# Patient Record
Sex: MALE | Race: WHITE
[De-identification: names, ages, dates, MRNs, and addresses within clinical notes are randomized per-mention and may not be internally consistent; named-entity substitution may affect disease eponyms.]

---

## 2017-01-04 ENCOUNTER — HOSPITAL ENCOUNTER (INPATIENT)
Dept: HOSPITAL 47 - EC | Age: 67
LOS: 3 days | Discharge: HOME HEALTH | DRG: 373 | End: 2017-01-07
Payer: MEDICARE

## 2017-01-04 VITALS — BODY MASS INDEX: 32.6 KG/M2

## 2017-01-04 DIAGNOSIS — Z79.899: ICD-10-CM

## 2017-01-04 DIAGNOSIS — E03.9: ICD-10-CM

## 2017-01-04 DIAGNOSIS — Z79.82: ICD-10-CM

## 2017-01-04 DIAGNOSIS — K80.20: ICD-10-CM

## 2017-01-04 DIAGNOSIS — Z88.0: ICD-10-CM

## 2017-01-04 DIAGNOSIS — K35.2: Primary | ICD-10-CM

## 2017-01-04 DIAGNOSIS — I10: ICD-10-CM

## 2017-01-04 PROCEDURE — 87075 CULTR BACTERIA EXCEPT BLOOD: CPT

## 2017-01-04 PROCEDURE — 85027 COMPLETE CBC AUTOMATED: CPT

## 2017-01-04 PROCEDURE — 85025 COMPLETE CBC W/AUTO DIFF WBC: CPT

## 2017-01-04 PROCEDURE — 80053 COMPREHEN METABOLIC PANEL: CPT

## 2017-01-04 PROCEDURE — 85730 THROMBOPLASTIN TIME PARTIAL: CPT

## 2017-01-04 PROCEDURE — 75989 ABSCESS DRAINAGE UNDER X-RAY: CPT

## 2017-01-04 PROCEDURE — 87205 SMEAR GRAM STAIN: CPT

## 2017-01-04 PROCEDURE — 87186 SC STD MICRODIL/AGAR DIL: CPT

## 2017-01-04 PROCEDURE — 85610 PROTHROMBIN TIME: CPT

## 2017-01-04 PROCEDURE — 87070 CULTURE OTHR SPECIMN AEROBIC: CPT

## 2017-01-04 PROCEDURE — 99285 EMERGENCY DEPT VISIT HI MDM: CPT

## 2017-01-04 PROCEDURE — 87077 CULTURE AEROBIC IDENTIFY: CPT

## 2017-01-04 NOTE — ED
General Adult HPI





- General


Chief complaint: Recheck/Abnormal Lab/Rx


Stated complaint: Adominal Pain


Time Seen by Provider: 01/04/17 22:43


Source: patient, EMS, RN notes reviewed


Mode of arrival: EMS


Limitations: no limitations





- History of Present Illness


Initial comments: 





Patient is a pleasant 66-year-old male presenting to the emergency department 

from transfer from [place].  Patient was transferred for surgical and 

possible interventional radiology care.  Patient has had abdominal discomfort 

for the past week.  Patient did have nausea and vomiting however that has 

resolved.  Patient has had intermittent fevers.  Patient has had discomfort 

right lower abdomen.  Patient states it did seem to get better and then worse 

again.  Patient has discomfort extending towards the right leg.  Discomfort is 

tolerable at this time.





- Related Data


 Home Medications











 Medication  Instructions  Recorded  Confirmed


 


Aspirin EC [Ecotrin Low Dose] 81 mg PO DAILY 01/04/17 01/04/17


 


Levothyroxine Sodium [Synthroid] 50 mcg PO DAILY 01/04/17 01/04/17











 Allergies











Allergy/AdvReac Type Severity Reaction Status Date / Time


 


Penicillins Allergy  Rash/Hives Verified 01/04/17 22:40














Review of Systems


ROS Statement: 


Those systems with pertinent positive or pertinent negative responses have been 

documented in the HPI.





ROS Other: All systems not noted in ROS Statement are negative.


Constitutional: Reports: fever, chills


Eyes: Denies: eye pain


ENT: Denies: ear pain


Respiratory: Denies: cough


Cardiovascular: Denies: chest pain


Endocrine: Denies: fatigue


Gastrointestinal: Reports: abdominal pain, nausea, vomiting (Resolved)


Genitourinary: Denies: dysuria


Musculoskeletal: Denies: back pain


Skin: Denies: rash


Neurological: Denies: weakness





Past Medical History


Past Medical History: Hypertension, Thyroid Disorder


History of Any Multi-Drug Resistant Organisms: None Reported


Past Surgical History: No Surgical Hx Reported


Past Psychological History: No Psychological Hx Reported


Smoking Status: Never smoker


Past Alcohol Use History: None Reported


Past Drug Use History: None Reported





General Exam


Limitations: no limitations


General appearance: alert, in no apparent distress


Head exam: Present: atraumatic


Eye exam: Present: normal appearance, PERRL


ENT exam: Present: normal oropharynx


Neck exam: Present: normal inspection


Respiratory exam: Present: normal lung sounds bilaterally


Cardiovascular Exam: Present: regular rate, normal rhythm


GI/Abdominal exam: Present: soft, tenderness (Mild tenderness right lower 

abdomen).  Absent: distended, guarding, rebound


Extremities exam: Present: normal inspection


Neurological exam: Present: alert


Psychiatric exam: Present: normal affect, normal mood


Skin exam: Absent: rash





Course


 Vital Signs











  01/04/17





  22:27


 


Temperature 98.8 F


 


Pulse Rate 84


 


Respiratory 16





Rate 


 


Blood Pressure 139/60


 


O2 Sat by Pulse 96





Oximetry 














- Reevaluation(s)


Reevaluation #1: 





01/04/17 23:06


Chart and lab work and CT results reviewed from [facility].  Patient 

has been white blood cell count of 16.  Patient does not meet Sirs criteria 

otherwise.  Computed tomography scan has concern for abscess in the right lower 

quadrant up to 6 cm with nonvisualized appendix.  Dr. Mueller has been paged for 

surgical call.


01/04/17 23:07


Patient was started on Cipro and Flagyl.


Antibiotics will be continued.  Admission orders written.


01/04/17 23:11


Case was discussed with Dr. Field who will review computed tomography scan with 

radiologist prior to making decision on appropriate further treatment.





Disposition


Clinical Impression: 


 Abdominal abscess





Disposition: ADMITTED AS IP TO THIS HOSP


Condition: Serious


Referrals: 


Nonstaff,Physician [Primary Care Provider] - 1-2 days


Time of Disposition: 23:07

## 2017-01-05 LAB
ALP SERPL-CCNC: 94 U/L (ref 38–126)
ALT SERPL-CCNC: 69 U/L (ref 21–72)
ANION GAP SERPL CALC-SCNC: 14 MMOL/L
APTT BLD: 23.7 SEC (ref 22–30)
AST SERPL-CCNC: 50 U/L (ref 17–59)
BASOPHILS # BLD AUTO: 0 K/UL (ref 0–0.2)
BASOPHILS NFR BLD AUTO: 0 %
BUN SERPL-SCNC: 13 MG/DL (ref 9–20)
CALCIUM SPEC-MCNC: 8.3 MG/DL (ref 8.4–10.2)
CH: 28.6
CHCM: 32.8
CHLORIDE SERPL-SCNC: 101 MMOL/L (ref 98–107)
CO2 SERPL-SCNC: 24 MMOL/L (ref 22–30)
EOSINOPHIL # BLD AUTO: 0.2 K/UL (ref 0–0.7)
EOSINOPHIL NFR BLD AUTO: 2 %
ERYTHROCYTE [DISTWIDTH] IN BLOOD BY AUTOMATED COUNT: 4.69 M/UL (ref 4.3–5.9)
ERYTHROCYTE [DISTWIDTH] IN BLOOD: 13 % (ref 11.5–15.5)
GLUCOSE SERPL-MCNC: 92 MG/DL (ref 74–99)
HCT VFR BLD AUTO: 41.1 % (ref 39–53)
HDW: 2.36
HGB BLD-MCNC: 13.6 GM/DL (ref 13–17.5)
INR PPP: 1.2 (ref ?–1.1)
LUC NFR BLD AUTO: 1 %
LYMPHOCYTES # SPEC AUTO: 0.7 K/UL (ref 1–4.8)
LYMPHOCYTES NFR SPEC AUTO: 5 %
MCH RBC QN AUTO: 29 PG (ref 25–35)
MCHC RBC AUTO-ENTMCNC: 33.1 G/DL (ref 31–37)
MCV RBC AUTO: 87.7 FL (ref 80–100)
MONOCYTES # BLD AUTO: 0.6 K/UL (ref 0–1)
MONOCYTES NFR BLD AUTO: 4 %
NEUTROPHILS # BLD AUTO: 12 K/UL (ref 1.3–7.7)
NEUTROPHILS NFR BLD AUTO: 87 %
NON-AFRICAN AMERICAN GFR(MDRD): >60
POTASSIUM SERPL-SCNC: 5.1 MMOL/L (ref 3.5–5.1)
PROT SERPL-MCNC: 6.4 G/DL (ref 6.3–8.2)
PT BLD: 11.8 SEC (ref 9–12)
SODIUM SERPL-SCNC: 139 MMOL/L (ref 137–145)
WBC # BLD AUTO: 0.18 10*3/UL
WBC # BLD AUTO: 13.7 K/UL (ref 3.8–10.6)
WBC (PEROX): 14.25

## 2017-01-05 PROCEDURE — 0W9M30Z DRAINAGE OF MALE PERINEUM WITH DRAINAGE DEVICE, PERCUTANEOUS APPROACH: ICD-10-PCS

## 2017-01-05 RX ADMIN — CEFAZOLIN SCH MLS/HR: 330 INJECTION, POWDER, FOR SOLUTION INTRAMUSCULAR; INTRAVENOUS at 18:01

## 2017-01-05 RX ADMIN — CEFAZOLIN SCH: 330 INJECTION, POWDER, FOR SOLUTION INTRAMUSCULAR; INTRAVENOUS at 14:03

## 2017-01-05 RX ADMIN — METRONIDAZOLE SCH MLS/HR: 500 INJECTION, SOLUTION INTRAVENOUS at 01:13

## 2017-01-05 RX ADMIN — METRONIDAZOLE SCH MLS/HR: 500 INJECTION, SOLUTION INTRAVENOUS at 13:59

## 2017-01-05 RX ADMIN — METRONIDAZOLE SCH MLS/HR: 500 INJECTION, SOLUTION INTRAVENOUS at 23:32

## 2017-01-05 RX ADMIN — CEFAZOLIN SCH MLS/HR: 330 INJECTION, POWDER, FOR SOLUTION INTRAMUSCULAR; INTRAVENOUS at 00:50

## 2017-01-05 RX ADMIN — LEVOFLOXACIN SCH MLS/HR: 750 INJECTION, SOLUTION INTRAVENOUS at 07:28

## 2017-01-05 RX ADMIN — METRONIDAZOLE SCH MLS/HR: 500 INJECTION, SOLUTION INTRAVENOUS at 06:04

## 2017-01-05 RX ADMIN — PANTOPRAZOLE SODIUM SCH MG: 40 INJECTION, POWDER, FOR SOLUTION INTRAVENOUS at 08:25

## 2017-01-05 RX ADMIN — METRONIDAZOLE SCH MLS/HR: 500 INJECTION, SOLUTION INTRAVENOUS at 18:00

## 2017-01-05 RX ADMIN — CEFAZOLIN SCH MLS/HR: 330 INJECTION, POWDER, FOR SOLUTION INTRAMUSCULAR; INTRAVENOUS at 23:32

## 2017-01-05 NOTE — CT
EXAMINATION TYPE: CT guided abscess drainage

 

DATE OF EXAM: 1/5/2017 12:17 PM

 

COMPARISON: EXAMINATION TYPE: CT guided abscess drainage

 

DATE OF EXAM: 1/5/2017 12:17 PM

 

HISTORY: Abdominal abscess

 

COMPARISON: NONE

 

PROCEDURE: 

 

Maximal barrier technique was utilized.  The skin over suitable path to the abscess was localized wit
h CT and the overlying skin prepped and draped.  Lidocaine was used for local anesthesia.  A skin kaur
k made with a scalpel.  Access was gained using CT guidance with a 21-gauge needle, purulent material
 returned in the hub of the needle.  A 0.018 inch wire was advanced and the access site was upsized, 
the wire was upsized and subsequently an 8.5-English drain was deployed within the abscess cavity and 
fixed in place.  Catheter attached to gravity drainage.  No immediate complication.  Purulent materia
l sent for laboratory analysis and draining into the bag.  The patient remained in stable condition.

 

IMPRESSION: 

 

STATUS POST CT GUIDED ABSCESS DRAINAGE, MICROBIOLOGY ANALYSIS IS PENDING.  THIS PROCEDURE WAS PERFORM
ED BY THE UNDERSIGNED.

 

CT DLP: 1105 mGycm

Automated exposure control for dose reduction was used.

## 2017-01-05 NOTE — HP
DATE OF ADMISSION:  



Chief complaint is abdominal pain and intra-abdominal abscess. 



HISTORY:  Jonel is a 66-year-old man who is usually in good health.  On 

12/25, he started having some GI complaints later in the day, had some 

nausea and vomiting. He went to bed and woke up about every hour with 

repeated vomiting and diarrhea. He did not feel well Monday, 12/26 but 

Tuesday was doing a little bit better and went to work.  he was not 

having much in the way of appetite that week and may have had a 

low-grade fever. He said he went in and was seen a couple of times by 

a physician but did not have any lab or imaging done and so he went to 

urgent care yesterday and they sent him to the emergency department 

where he had a CT scan done showing an abscess. He had not had any 

previous problems with diarrhea, he had not noticed any blood in the 

stools or dark tarry stools. No weight loss. He has not had a 

colonoscopy in about 20 years. There is no family history of GI 

malignancy or inflammatory bowel disease. No one else at home had been 

ill.  



His past medical history is positive for hypertension and 

hypothyroidism.  



PAST SURGICAL HISTORY: Denies any abdominal surgery. 



Medications as outpatient include: 

1. Aspirin.

2. Synthroid. 



Allergies include PENICILLINS, which causes a rash or hives. 



SOCIAL: Lives by himself. No tobacco or alcohol use. 



FAMILY HISTORY: Negative for GI malignancy or inflammatory bowel 

disease.  



REVIEW OF SYSTEMS: Unremarkable except as per chief complaint. 



PHYSICAL EXAM: 

Pleasant 66-year-old man. He is in no acute distress. He is alert and 

oriented x3. He has been afebrile since admission. Pulse is 68, 

respirations 16, blood pressure is 116/65.  

His neck is supple. No adenopathy or thyromegaly. Heart is regular 

rate and rhythm without murmur.  

Lungs are clear to auscultation. No wheezes, rales, rhonchi, or rubs. 

Abdomen is soft. Positive bowel sounds. He has got fullness in the 

right lower quadrant. No real guarding or rebound. No peritoneal 

signs.  

Extremities are without pitting edema. 



LAB: White count is 13,700, hemoglobin 13.6, platelets 334,000. His 

sodium and potassium are within normal range. BUN and creatinine are 

not elevated. CT scan of the abdomen and pelvis was reviewed with the 

radiologist. The appendix is not visualized. There is a complex fluid 

collection in the right lower quadrant consistent with large abscess 

and he has noted a large gallstone in the gallbladder along with 

multiple cysts in the kidneys.  



ASSESSMENT: 

1. Intra-abdominal abscess, likely delayed perforated appendicitis. 

2. Cholelithiasis. 

3. Hypertension. 

4. Hypothyroidism. 



PLAN:  Will discuss the case with the interventional radiologist for 

attempted percutaneous drainage, then delayed laparoscopic 

appendectomy if that is successful.  Will do DVT and ulcer prophylaxis 

and further recommendations to follow.

## 2017-01-06 LAB
CH: 28.7
CHCM: 32.7
ERYTHROCYTE [DISTWIDTH] IN BLOOD BY AUTOMATED COUNT: 4.35 M/UL (ref 4.3–5.9)
ERYTHROCYTE [DISTWIDTH] IN BLOOD: 13 % (ref 11.5–15.5)
HCT VFR BLD AUTO: 38.2 % (ref 39–53)
HDW: 2.41
HGB BLD-MCNC: 12.4 GM/DL (ref 13–17.5)
MCH RBC QN AUTO: 28.5 PG (ref 25–35)
MCHC RBC AUTO-ENTMCNC: 32.4 G/DL (ref 31–37)
MCV RBC AUTO: 88 FL (ref 80–100)
WBC # BLD AUTO: 10.6 K/UL (ref 3.8–10.6)

## 2017-01-06 RX ADMIN — METRONIDAZOLE SCH MLS/HR: 500 INJECTION, SOLUTION INTRAVENOUS at 13:41

## 2017-01-06 RX ADMIN — CEFAZOLIN SCH: 330 INJECTION, POWDER, FOR SOLUTION INTRAMUSCULAR; INTRAVENOUS at 18:29

## 2017-01-06 RX ADMIN — LEVOFLOXACIN SCH MLS/HR: 750 INJECTION, SOLUTION INTRAVENOUS at 06:37

## 2017-01-06 RX ADMIN — PANTOPRAZOLE SODIUM SCH MG: 40 INJECTION, POWDER, FOR SOLUTION INTRAVENOUS at 08:25

## 2017-01-06 RX ADMIN — LEVOTHYROXINE SODIUM SCH MCG: 50 TABLET ORAL at 05:53

## 2017-01-06 RX ADMIN — ASPIRIN 81 MG CHEWABLE TABLET SCH MG: 81 TABLET CHEWABLE at 08:24

## 2017-01-06 RX ADMIN — METRONIDAZOLE SCH MLS/HR: 500 INJECTION, SOLUTION INTRAVENOUS at 05:32

## 2017-01-06 RX ADMIN — METRONIDAZOLE SCH MLS/HR: 500 INJECTION, SOLUTION INTRAVENOUS at 23:46

## 2017-01-06 RX ADMIN — LISINOPRIL SCH MG: 20 TABLET ORAL at 08:24

## 2017-01-06 RX ADMIN — CEFAZOLIN SCH MLS/HR: 330 INJECTION, POWDER, FOR SOLUTION INTRAMUSCULAR; INTRAVENOUS at 12:42

## 2017-01-06 RX ADMIN — METRONIDAZOLE SCH MLS/HR: 500 INJECTION, SOLUTION INTRAVENOUS at 18:05

## 2017-01-06 NOTE — PN
DATE OF SERVICE:  01/06/2017



Jonel is seen on rounds. He is status post percutaneous drainage of 

intra-abdominal abscess. He is having mild discomfort. He is 

tolerating a diet. No nausea, no vomiting.  



PHYSICAL EXAM: He has been afebrile. Pulse is 60, respirations 16, 

blood pressure 123/62. His abdomen is soft. He has fullness in the 

right lower quadrant without guarding or rebound. The drain is 

draining mucopurulent material. Cultures were sent. Gram stain showed 

multiple gram-positive and gram-negative organisms.  



LAB: His white count is 10,600, hemoglobin is 12.4. 



ASSESSMENT: Status post percutaneous drainage of abscess, likely due 

to perforated appendicitis.  



PLAN:  Will continue the IV antibiotics one more day, arrange home 

health care for checking his drain and irrigating it daily. I will see 

him in the office next week and I explained to him that although this 

is likely an acute appendicitis with perforation, there can be other 

causes so he should have a colonoscopy performed within the next month 

or so. Then after he is recuperated, we will see about doing a 

laparoscopy with appendectomy and then possible cholecystectomy, which 

will be discussed in the office.

## 2017-01-07 VITALS — RESPIRATION RATE: 16 BRPM

## 2017-01-07 VITALS — TEMPERATURE: 98.6 F | HEART RATE: 64 BPM | SYSTOLIC BLOOD PRESSURE: 139 MMHG | DIASTOLIC BLOOD PRESSURE: 68 MMHG

## 2017-01-07 LAB
CH: 28.9
CHCM: 32.6
ERYTHROCYTE [DISTWIDTH] IN BLOOD BY AUTOMATED COUNT: 4.29 M/UL (ref 4.3–5.9)
ERYTHROCYTE [DISTWIDTH] IN BLOOD: 12.9 % (ref 11.5–15.5)
HCT VFR BLD AUTO: 38.2 % (ref 39–53)
HDW: 2.4
HGB BLD-MCNC: 12 GM/DL (ref 13–17.5)
MCH RBC QN AUTO: 28 PG (ref 25–35)
MCHC RBC AUTO-ENTMCNC: 31.5 G/DL (ref 31–37)
MCV RBC AUTO: 89 FL (ref 80–100)
WBC # BLD AUTO: 10.4 K/UL (ref 3.8–10.6)

## 2017-01-07 RX ADMIN — ASPIRIN 81 MG CHEWABLE TABLET SCH MG: 81 TABLET CHEWABLE at 08:20

## 2017-01-07 RX ADMIN — METRONIDAZOLE SCH: 500 INJECTION, SOLUTION INTRAVENOUS at 12:50

## 2017-01-07 RX ADMIN — LEVOFLOXACIN SCH MLS/HR: 750 INJECTION, SOLUTION INTRAVENOUS at 06:13

## 2017-01-07 RX ADMIN — LISINOPRIL SCH MG: 20 TABLET ORAL at 08:20

## 2017-01-07 RX ADMIN — METRONIDAZOLE SCH MLS/HR: 500 INJECTION, SOLUTION INTRAVENOUS at 05:11

## 2017-01-07 RX ADMIN — LEVOTHYROXINE SODIUM SCH MCG: 50 TABLET ORAL at 06:12

## 2017-01-07 RX ADMIN — CEFAZOLIN SCH MLS/HR: 330 INJECTION, POWDER, FOR SOLUTION INTRAMUSCULAR; INTRAVENOUS at 05:11

## 2017-01-07 NOTE — P.DS
Providers


Date of admission: 


01/04/17 23:07





Expected date of discharge: 01/07/17


Attending physician: 


Radha Mueller





Primary care physician: 


Physician Nonstaff





Hospital Course: 





Patient admitted with lower abdominal abscess.  This is thought to be on the 

basis of appendiceal abscess.  His percutaneous drain is functioning properly.  

His pain is minimal.  He is tolerating a diet.  He is afebrile.  White blood 

cell count is normal.  Plans are underway for him to be discharged today.  He 

will follow-up with Dr. Mueller this coming week.  Prescription for antibiotics 

is provided.


Patient Condition at Discharge: Serious





Plan - Discharge Summary


New Discharge Prescriptions: 


Ciprofloxacin HCl [Cipro] 500 mg PO Q12HR #28 tablet


metroNIDAZOLE [Flagyl] 500 mg PO Q8HR #42 tab


Discharge Medication List





Aspirin EC [Ecotrin Low Dose] 81 mg PO DAILY 01/04/17 [History]


Enalapril [Vasotec] 20 mg PO DAILY 01/04/17 [History]


Levothyroxine Sodium [Synthroid] 50 mcg PO DAILY 01/04/17 [History]


amLODIPine [Norvasc] 10 mg PO DAILY 01/04/17 [History]


Ciprofloxacin HCl [Cipro] 500 mg PO Q12HR #28 tablet 01/06/17 [Rx]


metroNIDAZOLE [Flagyl] 500 mg PO Q8HR #42 tab 01/06/17 [Rx]








Follow up Appointment(s)/Referral(s): 


Radha Mueller DO [Doctor of Osteopathic Medicine] - 01/12/17 4:15 pm


Nonstaff,Physician [Primary Care Provider] - 1-2 days


Activity/Diet/Wound Care/Special Instructions: 


Home Care - [facility] - [phone]


You need to have a colonoscopy done in the next 1-2 months.


The Drain tube should be irrigated with 10-20ML of NS daily.


Call if fever >100.5, increased abdominal pain, nausea or vomiting, or any 

other concerns.


Eat yogurt 2 times a day or take a probiotic daily.


Discharge Disposition: HOME WITH HOME HEALTH SERVICES

## 2018-08-10 ENCOUNTER — HOSPITAL ENCOUNTER (OUTPATIENT)
Dept: HOSPITAL 47 - LABPAT | Age: 68
Discharge: HOME | End: 2018-08-10
Payer: MEDICARE

## 2018-08-10 DIAGNOSIS — R94.39: ICD-10-CM

## 2018-08-10 DIAGNOSIS — I10: ICD-10-CM

## 2018-08-10 DIAGNOSIS — Z01.812: Primary | ICD-10-CM

## 2018-08-10 LAB
ANION GAP SERPL CALC-SCNC: 8 MMOL/L
BUN SERPL-SCNC: 17 MG/DL (ref 9–20)
CHLORIDE SERPL-SCNC: 104 MMOL/L (ref 98–107)
CO2 SERPL-SCNC: 29 MMOL/L (ref 22–30)
ERYTHROCYTE [DISTWIDTH] IN BLOOD BY AUTOMATED COUNT: 5.42 M/UL (ref 4.3–5.9)
ERYTHROCYTE [DISTWIDTH] IN BLOOD: 13.9 % (ref 11.5–15.5)
HCT VFR BLD AUTO: 47.2 % (ref 39–53)
HGB BLD-MCNC: 15.5 GM/DL (ref 13–17.5)
MCH RBC QN AUTO: 28.5 PG (ref 25–35)
MCHC RBC AUTO-ENTMCNC: 32.8 G/DL (ref 31–37)
MCV RBC AUTO: 87.1 FL (ref 80–100)
PLATELET # BLD AUTO: 178 K/UL (ref 150–450)
POTASSIUM SERPL-SCNC: 4.9 MMOL/L (ref 3.5–5.1)
SODIUM SERPL-SCNC: 141 MMOL/L (ref 137–145)
WBC # BLD AUTO: 7 K/UL (ref 3.8–10.6)

## 2018-08-10 PROCEDURE — 85027 COMPLETE CBC AUTOMATED: CPT

## 2018-08-10 PROCEDURE — 84520 ASSAY OF UREA NITROGEN: CPT

## 2018-08-10 PROCEDURE — 80051 ELECTROLYTE PANEL: CPT

## 2018-08-10 PROCEDURE — 82565 ASSAY OF CREATININE: CPT

## 2018-08-10 PROCEDURE — 36415 COLL VENOUS BLD VENIPUNCTURE: CPT

## 2018-08-20 ENCOUNTER — HOSPITAL ENCOUNTER (OUTPATIENT)
Dept: HOSPITAL 47 - CATHCVL | Age: 68
LOS: 1 days | Discharge: HOME | End: 2018-08-21
Payer: MEDICARE

## 2018-08-20 VITALS — BODY MASS INDEX: 34.2 KG/M2

## 2018-08-20 DIAGNOSIS — I12.9: ICD-10-CM

## 2018-08-20 DIAGNOSIS — R94.39: ICD-10-CM

## 2018-08-20 DIAGNOSIS — Z79.82: ICD-10-CM

## 2018-08-20 DIAGNOSIS — R00.1: ICD-10-CM

## 2018-08-20 DIAGNOSIS — N18.9: ICD-10-CM

## 2018-08-20 DIAGNOSIS — Z88.0: ICD-10-CM

## 2018-08-20 DIAGNOSIS — Z82.49: ICD-10-CM

## 2018-08-20 DIAGNOSIS — Z79.899: ICD-10-CM

## 2018-08-20 DIAGNOSIS — E78.00: ICD-10-CM

## 2018-08-20 DIAGNOSIS — I44.7: ICD-10-CM

## 2018-08-20 DIAGNOSIS — I25.110: Primary | ICD-10-CM

## 2018-08-20 DIAGNOSIS — Z79.890: ICD-10-CM

## 2018-08-20 DIAGNOSIS — E03.9: ICD-10-CM

## 2018-08-20 DIAGNOSIS — E66.9: ICD-10-CM

## 2018-08-20 PROCEDURE — 85025 COMPLETE CBC W/AUTO DIFF WBC: CPT

## 2018-08-20 PROCEDURE — 80048 BASIC METABOLIC PNL TOTAL CA: CPT

## 2018-08-20 PROCEDURE — 93454 CORONARY ARTERY ANGIO S&I: CPT

## 2018-08-20 RX ADMIN — MIDAZOLAM ONE MG: 1 INJECTION INTRAMUSCULAR; INTRAVENOUS at 09:52

## 2018-08-20 RX ADMIN — METOPROLOL TARTRATE SCH MG: 25 TABLET, FILM COATED ORAL at 20:40

## 2018-08-20 RX ADMIN — MIDAZOLAM ONE MG: 1 INJECTION INTRAMUSCULAR; INTRAVENOUS at 10:24

## 2018-08-20 NOTE — CC
CARDIAC CATHETERIZATION REPORT



DATE OF SERVICE:

08/20/2018.



PROCEDURE:

1. Coronary angiography.

2. PTCA and stenting of mid circumflex with 2 drug-eluting stents.



PERFORMED BY:

Dr. THA Wilder.



Moderate conscious sedation time was 68 minutes.



CLINICAL INFORMATION:

Mr. Jonel Palacios is a 68-year-old gentleman with a known history of hypertension with

recent positive stress test and was advised coronary angiography after due discussion

regarding risks, benefits, and options.



PROCEDURE NOTE:

Under local anesthesia and strict aseptic precautions using a micropuncture technique,

a 6-Gibraltarian introducer was placed in the right radial artery.  I used a JL3.5 and JR4

catheter to perform coronary angiography and then noted that he had a significant

lesion in the circumflex as well as the PDA branch of RCA and proceeded to perform

intervention of the circumflex vessel in the same setting.  LV pressures were not

obtained.



Following the procedure, a TR band was applied and good hemostasis was secured and

saturation of the fingers of the right hand was 93%.



PCI PROCEDURE DETAILS:

Initially I tried a JL4 and then an XB3.5 catheter and finally switched over to a

JL3.5.  With a JL3.5, I had a reasonable guide support.  A BMW wire was used to cross

the lesion.  Without predilatation, an 8 mm long 3.0 caliber Xience stent was deployed.

This was somewhat oversized.  Distal to it, there was still an area of narrowing and

this was addressed with a 2.5 caliber 8 mm Xience stent.  Excellent angiographic result

was achieved.  Patient did not have chest pain or EKG changes.  He had a significantly

abnormal stress echo with a apical lateral ischemia as well.  I performed stenting of

mid circumflex using 2 drug-eluting stents.  Excellent angiographic result was

achieved.  The sheath was taken out and TR band applied as per protocol.  The patient

received Angiomax bolus and infusion.  He also received 180 mg of Brilinta.



He tolerated the procedure well without complications.



CORONARY ANGIOGRAPHY FINDINGS:

RIGHT CORONARY ARTERY: Technically large dominant vessel.  No significant disease in

the proximal and midportion. Distally, it bifurcates into PDA and PLV.  The PLV is free

of significant disease, it gives off 2 smaller branches.  PDA is a good caliber, good

distribution vessel.  At the ostium, there is about a 80% to 90% stenosis which I

believe is quite significant.



LEFT MAIN CORONARY ARTERY: Short patent vessel that immediately bifurcates into LAD and

circumflex.  No significant disease in the left main coronary artery.



LEFT ANTERIOR DESCENDING CORONARY ARTERY: This vessel has about a 45% lesion in the in

the midportion and again another area of 30% to 40% narrowing at the origin of the

third diagonal branch.  The rest of the vessel has minor diffuse irregularities.  No

critical lesions are noted in the LAD, but there are 2 tandem lesions of 45% and 40%

respectively. The vessel is fairly of good caliber, gives off 3 diagonal branches.  The

first one is small.  The second and third are are fair-sized and several septal

branches that are free of significant disease.



LEFT POSTERIOR CIRCUMFLEX CORONARY ARTERY:  Technically a nondominant vessel gives off

a good-sized obtuse marginal that runs laterally.  In the midportion, there is an

eccentric 80% narrowing. Before the lesion also there is some area of disease and

distal to it also there is some haziness.  This is also a significant lesion.  In the

proximal portion, there is about a 40% narrowing but beyond that there is 80% area

which I believe is significant.



LEFT VENTRICULOGRAM: This was not performed.



FINAL IMPRESSION:

This patient has an 80% mid circumflex lesion and an 80% ostial PDA lesion of the right

coronary artery which is a dominant vessel.  The left anterior descending artery has a

45% and 40% mid lesions.



RECOMMENDATION:

I recommended PCI of circumflex that was performed expeditiously.  Two drug-eluting

stents were deployed.  Excellent angiographic result was achieved.





MMODRAYNE / IJN: [id number] / Job#: [id number]

## 2018-08-21 VITALS
RESPIRATION RATE: 16 BRPM | TEMPERATURE: 97 F | SYSTOLIC BLOOD PRESSURE: 138 MMHG | DIASTOLIC BLOOD PRESSURE: 76 MMHG | HEART RATE: 59 BPM

## 2018-08-21 LAB
ANION GAP SERPL CALC-SCNC: 6 MMOL/L
BASOPHILS # BLD AUTO: 0.1 K/UL (ref 0–0.2)
BASOPHILS NFR BLD AUTO: 1 %
BUN SERPL-SCNC: 18 MG/DL (ref 9–20)
CALCIUM SPEC-MCNC: 8.7 MG/DL (ref 8.4–10.2)
CHLORIDE SERPL-SCNC: 106 MMOL/L (ref 98–107)
CO2 SERPL-SCNC: 27 MMOL/L (ref 22–30)
EOSINOPHIL # BLD AUTO: 0.4 K/UL (ref 0–0.7)
EOSINOPHIL NFR BLD AUTO: 5 %
ERYTHROCYTE [DISTWIDTH] IN BLOOD BY AUTOMATED COUNT: 4.92 M/UL (ref 4.3–5.9)
ERYTHROCYTE [DISTWIDTH] IN BLOOD: 14.3 % (ref 11.5–15.5)
GLUCOSE SERPL-MCNC: 94 MG/DL (ref 74–99)
HCT VFR BLD AUTO: 43.7 % (ref 39–53)
HGB BLD-MCNC: 14 GM/DL (ref 13–17.5)
LYMPHOCYTES # SPEC AUTO: 0.9 K/UL (ref 1–4.8)
LYMPHOCYTES NFR SPEC AUTO: 11 %
MCH RBC QN AUTO: 28.5 PG (ref 25–35)
MCHC RBC AUTO-ENTMCNC: 32 G/DL (ref 31–37)
MCV RBC AUTO: 88.8 FL (ref 80–100)
MONOCYTES # BLD AUTO: 0.5 K/UL (ref 0–1)
MONOCYTES NFR BLD AUTO: 6 %
NEUTROPHILS # BLD AUTO: 6.5 K/UL (ref 1.3–7.7)
NEUTROPHILS NFR BLD AUTO: 76 %
PLATELET # BLD AUTO: 167 K/UL (ref 150–450)
POTASSIUM SERPL-SCNC: 4.3 MMOL/L (ref 3.5–5.1)
SODIUM SERPL-SCNC: 139 MMOL/L (ref 137–145)
WBC # BLD AUTO: 8.5 K/UL (ref 3.8–10.6)

## 2018-08-21 RX ADMIN — METOPROLOL TARTRATE SCH MG: 25 TABLET, FILM COATED ORAL at 08:16

## 2018-08-21 NOTE — DS
DISCHARGE SUMMARY



DATE OF ADMISSION:

08/20/2018.



DATE OF DISCHARGE:

08/21/2018.



DIAGNOSES:

1. Unstable angina with a positive stress test.

2. Hypertension.

3. Hypercholesterolemia.

4. Mild chronic kidney disease.



CLINICAL INFORMATION:

Mr. Jonel Palacios was seen by me in the office with a positive stress test, advised

coronary angiography and PCI.  I discussed with him the risks, benefits, options and

rationale and he was brought in for the procedure electively.  Procedure was performed

from right radial approach.  He had 80% mid circumflex lesion as well as an ostial PDA

lesion of the RCA which was a dominant vessel.  He underwent stenting of the circumflex

with 2 drug-eluting stents with excellent angiographic result.  Because of the contrast

administration, I did not do the RCA intervention and this will be staged in about a

week after checking renal function.  This morning, patient is doing very well.  He is

asymptomatic.  His right radial cath site is clean and dry with a good pulse.  His EKG

revealed sinus mechanism with left bundle which is his baseline.  His labs are good.

Creatinine is 1.3, which is almost same as baseline.  He has no symptoms.



PHYSICAL EXAMINATION:

Blood pressure is 120/70. Pulse rate is 54 per minute. S1, S2 heard normally. Lungs are

clear. Abdomen and lower extremity exam unchanged. Right radial cath site is clean and

dry.



The patient will be discharged today after he ambulates.  Discharge instructions

regarding activity, diet and medications were given.  He will be on aspirin and

Brilinta.  Atorvastatin 80 mg will be added and a sublingual nitroglycerin also will be

given.  His other medications will be continued.  I will see him in the office at 8 am

on Thursday, August 23.





MMODL / IJN: [id number] / Job#: [id number]

## 2018-08-23 ENCOUNTER — HOSPITAL ENCOUNTER (OUTPATIENT)
Dept: HOSPITAL 47 - LABWHC1 | Age: 68
Discharge: HOME | End: 2018-08-23
Payer: MEDICARE

## 2018-08-23 DIAGNOSIS — I10: ICD-10-CM

## 2018-08-23 DIAGNOSIS — I25.10: Primary | ICD-10-CM

## 2018-08-23 LAB
ANION GAP SERPL CALC-SCNC: 7 MMOL/L
BUN SERPL-SCNC: 18 MG/DL (ref 9–20)
CALCIUM SPEC-MCNC: 9 MG/DL (ref 8.4–10.2)
CHLORIDE SERPL-SCNC: 105 MMOL/L (ref 98–107)
CO2 SERPL-SCNC: 28 MMOL/L (ref 22–30)
ERYTHROCYTE [DISTWIDTH] IN BLOOD BY AUTOMATED COUNT: 5.36 M/UL (ref 4.3–5.9)
ERYTHROCYTE [DISTWIDTH] IN BLOOD: 14 % (ref 11.5–15.5)
GLUCOSE SERPL-MCNC: 95 MG/DL (ref 74–99)
HCT VFR BLD AUTO: 47 % (ref 39–53)
HGB BLD-MCNC: 15.4 GM/DL (ref 13–17.5)
MCH RBC QN AUTO: 28.8 PG (ref 25–35)
MCHC RBC AUTO-ENTMCNC: 32.8 G/DL (ref 31–37)
MCV RBC AUTO: 87.8 FL (ref 80–100)
PLATELET # BLD AUTO: 183 K/UL (ref 150–450)
POTASSIUM SERPL-SCNC: 4.5 MMOL/L (ref 3.5–5.1)
SODIUM SERPL-SCNC: 140 MMOL/L (ref 137–145)
WBC # BLD AUTO: 9.1 K/UL (ref 3.8–10.6)

## 2018-08-23 PROCEDURE — 80048 BASIC METABOLIC PNL TOTAL CA: CPT

## 2018-08-23 PROCEDURE — 85027 COMPLETE CBC AUTOMATED: CPT

## 2018-08-23 PROCEDURE — 36415 COLL VENOUS BLD VENIPUNCTURE: CPT
